# Patient Record
Sex: FEMALE | Race: OTHER | HISPANIC OR LATINO | URBAN - METROPOLITAN AREA
[De-identification: names, ages, dates, MRNs, and addresses within clinical notes are randomized per-mention and may not be internally consistent; named-entity substitution may affect disease eponyms.]

---

## 2022-11-03 ENCOUNTER — EMERGENCY (EMERGENCY)
Facility: HOSPITAL | Age: 72
LOS: 1 days | Discharge: HOME | End: 2022-11-03
Attending: EMERGENCY MEDICINE | Admitting: EMERGENCY MEDICINE

## 2022-11-03 VITALS
RESPIRATION RATE: 16 BRPM | DIASTOLIC BLOOD PRESSURE: 76 MMHG | SYSTOLIC BLOOD PRESSURE: 127 MMHG | TEMPERATURE: 98 F | HEART RATE: 66 BPM | OXYGEN SATURATION: 100 %

## 2022-11-03 PROCEDURE — 71046 X-RAY EXAM CHEST 2 VIEWS: CPT | Mod: 26

## 2022-11-03 PROCEDURE — 99283 EMERGENCY DEPT VISIT LOW MDM: CPT | Mod: GC

## 2022-11-03 PROCEDURE — 73030 X-RAY EXAM OF SHOULDER: CPT | Mod: 26,RT

## 2022-11-03 PROCEDURE — 73564 X-RAY EXAM KNEE 4 OR MORE: CPT | Mod: 26,50

## 2022-11-03 PROCEDURE — 73100 X-RAY EXAM OF WRIST: CPT | Mod: 26,RT

## 2022-11-03 PROCEDURE — 73590 X-RAY EXAM OF LOWER LEG: CPT | Mod: 26,50

## 2022-11-03 PROCEDURE — 72170 X-RAY EXAM OF PELVIS: CPT | Mod: 26

## 2022-11-03 PROCEDURE — 73120 X-RAY EXAM OF HAND: CPT | Mod: 26,RT

## 2022-11-03 RX ORDER — ACETAMINOPHEN 500 MG
650 TABLET ORAL ONCE
Refills: 0 | Status: COMPLETED | OUTPATIENT
Start: 2022-11-03 | End: 2022-11-03

## 2022-11-03 RX ADMIN — Medication 650 MILLIGRAM(S): at 23:25

## 2022-11-03 NOTE — ED PROVIDER NOTE - OBJECTIVE STATEMENT
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 pt was walking, tripped and fell forward, no head trauma or LOC. able to get up and walk home herself, took "4 hours to walk 4 blocks home" per family. Since then pain of the right shoulder/wrist and both knees. Has "bad" right knee, due to start physical therapy in  now "both knees' hurt from fall. no numbness/weakness. no cp/sob

## 2022-11-03 NOTE — ED PROVIDER NOTE - PHYSICAL EXAMINATION
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 no sign of head/facial trauma. AO3.  supple neck, nl rom  nontender midline cervical/thoracic or lumbar spine  nontender chest wall. no ecchymosis of chest/back or abdomen.   normal S1-S2  No resp distress. able to speak in full and clear sentences. no wheeze, rales or stridor.   soft nontender abdomen. no  rebound. no guarding. no sign of trauma. no CVAT  stable pelvis, no shortening or rotation of bl lower ext. no lac noted of bl lower ext. nl rom of bl hips and knees, able to flex/extend bl knees w some pain.  nl  bl hands. mild tender of the right hand, no snuff box tenderness bl hands   no lac of hands noted

## 2022-11-03 NOTE — ED PROVIDER NOTE - PROGRESS NOTE DETAILS
pt pending xray read, sign out to Dr Escobar Raymond [place] PGY3: XRs negative. pt ambulatory. Will DC w/ outpt follow up. Pt and daughter in agreement with plan.

## 2022-11-03 NOTE — ED ADULT TRIAGE NOTE - CHIEF COMPLAINT QUOTE
Pt w/ hx of hld arthritis c/o left knee pain s/p mechanical fall 3 days ago PT needs assistance to ambulate d/t pain

## 2022-11-03 NOTE — ED PROVIDER NOTE - NSFOLLOWUPCLINICS_GEN_ALL_ED_FT
[facility]  Orthopedic Surgery  [address], 3rd & 4th [place] [address]  Phone: [phone]  Fax:   Follow Up Time: 4-6 Days

## 2022-11-03 NOTE — ED ADULT NURSE NOTE - OBJECTIVE STATEMENT
pt from home, accompanied by daughter. primarily Czech speaking, daughter translation but pt can also communicated needs. pt had a mechanical fall about 3 days ago, comes in today for persistent pain to both knees. pt able to flex and extend. tender to medial patellar aspect of bilateral knees. no obvious deformity, bruising or swelling noted. also pain with weight bearing.

## 2022-11-03 NOTE — ED PROVIDER NOTE - PATIENT PORTAL LINK FT
You can access the FollowMyHealth Patient Portal offered by [facility] by registering at the following website: [email or web address]. By joining [facility]’s FollowMyHealth portal, you will also be able to view your health information using other applications (apps) compatible with our system.

## 2022-11-03 NOTE — ED ADULT NURSE NOTE - NSIMPLEMENTINTERV_GEN_ALL_ED
Implemented All Fall with Harm Risk Interventions:  [place] to call system. Call bell, personal items and telephone within reach. Instruct patient to call for assistance. Room bathroom lighting operational. Non-slip footwear when patient is off stretcher. Physically safe environment: no spills, clutter or unnecessary equipment. Stretcher in lowest position, wheels locked, appropriate side rails in place. Provide visual cue, wrist band, yellow gown, etc. Monitor gait and stability. Monitor for mental status changes and reorient to person, place, and time. Review medications for side effects contributing to fall risk. Reinforce activity limits and safety measures with patient and family. Provide visual clues: red socks.

## 2022-11-03 NOTE — ED PROVIDER NOTE - NSFOLLOWUPINSTRUCTIONS_ED_ALL_ED_FT
1. You presented to the emergency department for:  Fall with joint pains    2. Your evaluation in the emergency department included a physician evaluation and testing consisting of: xrays. Your work-up did not reveal any findings indicating the need for admission to the hospital or any emergent interventions at this time.     3. It is recommended that you follow-up with orthopedics or your primary care doctor within 3-5 days as discussed for a repeat evaluation, and potentially further testing and treatment.     If needed, to arrange an appointment with a primary care provider please call: [phone]    4. Please continue taking any regular medications as prescribed.     For pain you may take 500-1000mg Tylenol every 8 hours - as needed.  This is an over-the-counter medication - please read the instructions for use and warnings on the label. If you have any questions regarding its use, you may refer them to your local pharmacist.    5. PLEASE RETURN TO THE EMERGENCY DEPARTMENT IMMEDIATELY IF you develop any fevers not responding to over the counter medications, uncontrollable nausea and vomiting, an inability to tolerate eating and drinking, difficulty breathing, chest pain, a severe increase in your symptoms or pain, or any other new symptoms that concern you.

## 2022-11-04 VITALS
TEMPERATURE: 99 F | RESPIRATION RATE: 16 BRPM | DIASTOLIC BLOOD PRESSURE: 72 MMHG | OXYGEN SATURATION: 99 % | SYSTOLIC BLOOD PRESSURE: 129 MMHG | HEART RATE: 64 BPM

## 2023-07-13 NOTE — ED PROVIDER NOTE - WR ORDER DATE AND TIME 5
03-Nov-2022 22:16 Full Thickness Lip Wedge Repair (Flap) Text: Given the location of the defect and the proximity to free margins a full thickness wedge repair was deemed most appropriate.  Using a sterile surgical marker, the appropriate repair was drawn incorporating the defect and placing the expected incisions perpendicular to the vermilion border.  The vermilion border was also meticulously outlined to ensure appropriate reapproximation during the repair.  The area thus outlined was incised through and through with a #15 scalpel blade.  The muscularis and dermis were reaproximated with deep sutures following hemostasis. Care was taken to realign the vermilion border before proceeding with the superficial closure.  Once the vermilion was realigned the superfical and mucosal closure was finished.

## 2024-12-10 NOTE — ED PROVIDER NOTE - CARE PLAN
I do not see a follow-up visit scheduled.  Please schedule follow-up visit and do labs 1 to 2 days prior to the visit.  Labs ordered.   Principal Discharge DX:	Knee pain, bilateral   1